# Patient Record
Sex: MALE | Race: BLACK OR AFRICAN AMERICAN | NOT HISPANIC OR LATINO | Employment: UNEMPLOYED | URBAN - METROPOLITAN AREA
[De-identification: names, ages, dates, MRNs, and addresses within clinical notes are randomized per-mention and may not be internally consistent; named-entity substitution may affect disease eponyms.]

---
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
## 2021-10-15 ENCOUNTER — CLINICAL SUPPORT (OUTPATIENT)
Dept: URGENT CARE | Facility: CLINIC | Age: 21
End: 2021-10-15
Payer: MEDICAID

## 2021-10-15 DIAGNOSIS — Z20.822 ENCOUNTER FOR LABORATORY TESTING FOR COVID-19 VIRUS: Primary | ICD-10-CM

## 2021-10-15 LAB
CTP QC/QA: YES
SARS-COV-2 RDRP RESP QL NAA+PROBE: NEGATIVE

## 2021-10-15 PROCEDURE — U0002: ICD-10-PCS | Mod: QW,S$GLB,, | Performed by: NURSE PRACTITIONER

## 2021-10-15 PROCEDURE — U0002 COVID-19 LAB TEST NON-CDC: HCPCS | Mod: QW,S$GLB,, | Performed by: NURSE PRACTITIONER

## 2024-01-01 ENCOUNTER — HOSPITAL ENCOUNTER (EMERGENCY)
Facility: HOSPITAL | Age: 24
Discharge: HOME | End: 2024-01-01
Attending: STUDENT IN AN ORGANIZED HEALTH CARE EDUCATION/TRAINING PROGRAM
Payer: MEDICAID

## 2024-01-01 VITALS
SYSTOLIC BLOOD PRESSURE: 138 MMHG | DIASTOLIC BLOOD PRESSURE: 89 MMHG | OXYGEN SATURATION: 100 % | WEIGHT: 190 LBS | HEIGHT: 72 IN | BODY MASS INDEX: 25.73 KG/M2 | HEART RATE: 84 BPM | TEMPERATURE: 98 F | RESPIRATION RATE: 18 BRPM

## 2024-01-01 DIAGNOSIS — K59.00 CONSTIPATION, UNSPECIFIED CONSTIPATION TYPE: Primary | ICD-10-CM

## 2024-01-01 PROCEDURE — 25000003 PHARM REV CODE 250: Performed by: PHYSICIAN ASSISTANT

## 2024-01-01 PROCEDURE — 99283 EMERGENCY DEPT VISIT LOW MDM: CPT

## 2024-01-01 RX ORDER — PSEUDOEPHEDRINE/ACETAMINOPHEN 30MG-500MG
100 TABLET ORAL
Status: COMPLETED | OUTPATIENT
Start: 2024-01-01 | End: 2024-01-01

## 2024-01-01 RX ORDER — POLYETHYLENE GLYCOL 3350 17 G/17G
17 POWDER, FOR SOLUTION ORAL DAILY
Qty: 14 EACH | Refills: 0 | Status: SHIPPED | OUTPATIENT
Start: 2024-01-01 | End: 2024-01-15

## 2024-01-01 RX ORDER — LINACLOTIDE 290 UG/1
290 CAPSULE, GELATIN COATED ORAL EVERY MORNING
COMMUNITY
Start: 2023-12-22

## 2024-01-01 RX ORDER — SYRING-NEEDL,DISP,INSUL,0.3 ML 29 G X1/2"
296 SYRINGE, EMPTY DISPOSABLE MISCELLANEOUS
Status: COMPLETED | OUTPATIENT
Start: 2024-01-01 | End: 2024-01-01

## 2024-01-01 RX ADMIN — SODIUM CHLORIDE 500 ML: 9 INJECTION, SOLUTION INTRAVENOUS at 10:01

## 2024-01-01 RX ADMIN — BE HEALTH MAGNESIUM CITRATE ORAL SOLUTION - LEMON 296 ML: 1.75 LIQUID ORAL at 10:01

## 2024-01-01 RX ADMIN — Medication 100 ML: at 10:01

## 2024-01-02 NOTE — ED PROVIDER NOTES
Encounter Date: 1/1/2024       History     Chief Complaint   Patient presents with    Constipation     Pt presents to the ED today for constipation x2 months. Per pt he has been having constipation on and off for a year and is due to see a gastroenterologist on 01/08/24. Pt reports LBM two days ago despite taking all the prescribed and over the counter stool softeners. Pt reports blood in stool two days ago when he had his last BM.      22yo M presents to ED complaining of constipation.    Patient admits to frequent constipation x1 year.  He states he was initially given unknown medication by a gastroenterologist, denies relief.  He states he was then started on Linzess by his PCP, denies relief.  Last BM was 3 days ago.  He admits to blood mixed in with stool, blood after wiping after BMs over the past 2 weeks.  Denies angelique hematochezia.  Denies history of previous GI bleeding.  No ASA use. No frequent NSAID use. Denies abdominal pain.  No nausea vomiting.  He does admit to pain with BMs, tenesmus.  Does admit to history of hemorrhoids, denies any emergent at this time.  Denies anus pain at this time.  Denies history of fissures.    States he is well hydrated.  Has been taking Mag-Ox over-the-counter supplement to have a BM; did not take any today.  Used an enema today without relief.    No history of any abdominal surgeries.     States has upcoming appointment with Gastroenterology on 01/08/2024      Review of patient's allergies indicates:  No Known Allergies  Past Medical History:   Diagnosis Date    Constipation      No past surgical history on file.  Family History   Problem Relation Age of Onset    Macular degeneration Maternal Grandmother     Macular degeneration Maternal Grandfather     Thyroid disease Neg Hx     Stroke Neg Hx     Strabismus Neg Hx     Retinal detachment Neg Hx     Glaucoma Neg Hx     Diabetes Neg Hx     Blindness Neg Hx     Amblyopia Neg Hx      Social History     Tobacco Use    Smoking  status: Never    Smokeless tobacco: Never   Substance Use Topics    Alcohol use: No    Drug use: No     Review of Systems   Constitutional:  Negative for appetite change and fever.   Gastrointestinal:  Positive for blood in stool, constipation and rectal pain. Negative for abdominal pain, nausea and vomiting.   Neurological:  Negative for syncope.       Physical Exam     Initial Vitals [01/01/24 2117]   BP Pulse Resp Temp SpO2   129/62 92 18 98 °F (36.7 °C) 100 %      MAP       --         Physical Exam    Nursing note and vitals reviewed.  Constitutional: He appears well-developed and well-nourished. He is not diaphoretic. No distress.   HENT:   Head: Normocephalic and atraumatic.   Neck: Neck supple.   Normal range of motion.  Genitourinary:    Genitourinary Comments: CHONG with firm mass in distal rectum, did not wish to have manual disimpaction. Stage 2 or 3 hemorrhoids, no active bleeding. No anal fissure.      Musculoskeletal:         General: Normal range of motion.      Cervical back: Normal range of motion and neck supple.     Neurological: He is alert.   Skin: Skin is warm.         ED Course   Procedures  Labs Reviewed - No data to display       Imaging Results    None          Medications   glycerin 99.5% topical solution 100 mL (100 mLs Rectal Given 1/1/24 2255)     And   magnesium citrate solution 296 mL (296 mLs Rectal Given 1/1/24 2255)     And   sodium chloride 0.9% bolus 500 mL 500 mL (500 mLs Rectal New Bag 1/1/24 2256)     Medical Decision Making  Differential diagnosis:  Fecal impaction, chronic constipation, psychogenic constipation, medication noncompliance    Risk  OTC drugs.               ED Course as of 01/02/24 0023   Mon Jan 01, 2024   2347 Large BM in the ED.  Feels comfortable with discharge.  States rectal pressure, discomfort has significantly improved.  Will start on daily MiraLax, advised over-the-counter fiber supplement, follow-up with GI as planned.  Return precautions given. [SM]       ED Course User Index  [SM] Fredi Briceño PA-C                           Clinical Impression:  Final diagnoses:  [K59.00] Constipation, unspecified constipation type (Primary)          ED Disposition Condition    Discharge Stable          ED Prescriptions       Medication Sig Dispense Start Date End Date Auth. Provider    polyethylene glycol (GLYCOLAX) 17 gram PwPk Take 17 g by mouth once daily. for 14 days 14 each 1/1/2024 1/15/2024 Fredi Briceño PA-C          Follow-up Information       Follow up With Specialties Details Why Contact Info    Gastroenterology  Go to  For reevaluation              Fredi Briceño PA-C  01/02/24 0023

## 2024-01-02 NOTE — DISCHARGE INSTRUCTIONS
Make sure you are drinking plenty of fluids.  High-fiber diet.  Supplement fiber with over-the-counter Metamucil.  Begin MiraLax daily.    Follow-up with Gastroenterology as planned.    Return to this ED if you continue with frequent constipation despite plan, if you develop abdominal pain, if you begin with nausea vomiting, if unable to have a bowel movement, if any other problems occur.

## 2024-03-08 PROBLEM — K92.2 GI BLEEDING: Status: ACTIVE | Noted: 2024-03-08

## 2024-04-05 ENCOUNTER — TELEPHONE (OUTPATIENT)
Dept: GASTROENTEROLOGY | Facility: CLINIC | Age: 24
End: 2024-04-05
Payer: MEDICAID

## 2024-04-05 NOTE — TELEPHONE ENCOUNTER
I informed the patient that he needs a referral from his PCP to CRS at [facility] to have them remove the Hemorrhoids.      ----- Message from Bianca Dahl sent at 4/5/2024  3:03 PM CDT -----  Type:  Sooner Apoointment Request    Caller is requesting a sooner appointment.  Caller declined first available appointment listed below.  Caller will not accept being placed on the waitlist and is requesting a message be sent to doctor.  Name of Caller:pt   When is the first available appointment?  Symptoms:hemorrhoids removal  Would the patient rather a call back or a response via [facility]? call  Best Call Back Number:[phone]  Additional Information: states he would to schedule an appt to remove internal hemorrhoids